# Patient Record
(demographics unavailable — no encounter records)

---

## 2025-05-02 NOTE — HISTORY OF PRESENT ILLNESS
[FreeTextEntry1] : This is a pleasant 69 yr old F seen today for a new patient visit for Type 2 DM  She reports being diagnosed with Type 2 DM about 10 yrs ago and prior to that she had Pre-DM  Other medical hx of HTN, HLP,   Current regimen - Ozempic 0.5 mg once weekly, Metformin 1000 mg BID and Jardiance 25 mg once daily She states that she was on Ozempic 2 mg once weekly and the dose was lowered to 0.5 mg since last November or so.  Other medications - Propanolol 10 mg once daily, Telmisartan 80 mg, Amitriptyline e25 mg , amlodipine 5 mg once daily, Metformin 1000 mg BID, ASA 81 mg once daily, Atorvastatin 20 mg once daily, and Citalopram 10 mg once daily

## 2025-05-02 NOTE — PHYSICAL EXAM
[Alert] : alert [Normal Sclera/Conjunctiva] : normal sclera/conjunctiva [Normal Outer Ear/Nose] : the ears and nose were normal in appearance [No Neck Mass] : no neck mass was observed [No Respiratory Distress] : no respiratory distress [Normal PMI] : the apical impulse was normal [Carotids Normal] : carotid pulses were normal with no bruits [Normal Bowel Sounds] : normal bowel sounds [Normal Supraclavicular Nodes] : no supraclavicular lymphadenopathy [No Stigmata of Cushings Syndrome] : no stigmata of Cushings Syndrome [Oriented x3] : oriented to person, place, and time

## 2025-07-10 NOTE — HISTORY OF PRESENT ILLNESS
[FreeTextEntry1] : This is a pleasant 69 yr old F seen today for a follow up visit for Type 2 DM She is accompanied by her  today  reports concern about memory loss in patient.  Has an initial Neurology apt tomorrow  She reports being diagnosed with Type 2 DM about 10 yrs ago and prior to that she had Pre-DM  Other medical hx of HTN, HLP,   Current regimen - Ozempic 1.0 mg once weekly, Metformin 1000 mg BID and Jardiance 25 mg once daily She states that she was on Ozempic 2 mg once weekly and the dose was lowered to 0.5 mg since last November or so.  Other medications - Propanolol 10 mg once daily, Telmisartan 80 mg, Amitriptyline e25 mg , amlodipine 5 mg once daily, Metformin 1000 mg BID, ASA 81 mg once daily, Atorvastatin 20 mg once daily, and Citalopram 10 mg once daily